# Patient Record
Sex: FEMALE | Race: WHITE | NOT HISPANIC OR LATINO
[De-identification: names, ages, dates, MRNs, and addresses within clinical notes are randomized per-mention and may not be internally consistent; named-entity substitution may affect disease eponyms.]

---

## 2019-11-21 ENCOUNTER — APPOINTMENT (OUTPATIENT)
Dept: OBGYN | Facility: CLINIC | Age: 44
End: 2019-11-21
Payer: COMMERCIAL

## 2019-11-21 VITALS
BODY MASS INDEX: 21.66 KG/M2 | SYSTOLIC BLOOD PRESSURE: 102 MMHG | WEIGHT: 130 LBS | HEIGHT: 65 IN | DIASTOLIC BLOOD PRESSURE: 67 MMHG

## 2019-11-21 DIAGNOSIS — Z12.31 ENCOUNTER FOR SCREENING MAMMOGRAM FOR MALIGNANT NEOPLASM OF BREAST: ICD-10-CM

## 2019-11-21 DIAGNOSIS — N92.6 IRREGULAR MENSTRUATION, UNSPECIFIED: ICD-10-CM

## 2019-11-21 PROBLEM — Z00.00 ENCOUNTER FOR PREVENTIVE HEALTH EXAMINATION: Status: ACTIVE | Noted: 2019-11-21

## 2019-11-21 LAB
HCG UR QL: NEGATIVE
QUALITY CONTROL: YES

## 2019-11-21 PROCEDURE — 99386 PREV VISIT NEW AGE 40-64: CPT

## 2019-11-21 PROCEDURE — 81025 URINE PREGNANCY TEST: CPT

## 2019-11-22 LAB — HPV HIGH+LOW RISK DNA PNL CVX: NOT DETECTED

## 2019-12-01 NOTE — PHYSICAL EXAM
[Awake] : awake [Alert] : alert [Soft] : soft [Oriented x3] : oriented to person, place, and time [Normal] : uterus [No Bleeding] : there was no active vaginal bleeding [Uterine Adnexae] : were not tender and not enlarged [LAD] : no lymphadenopathy [Acute Distress] : no acute distress [Thyroid Nodule] : no thyroid nodule [Goiter] : no goiter [Mass] : no breast mass [Nipple Discharge] : no nipple discharge [Axillary LAD] : no axillary lymphadenopathy [Distended] : not distended [Tender] : non tender [Depressed Mood] : not depressed [H/Smegaly] : no hepatosplenomegaly [Flat Affect] : affect not flat

## 2019-12-02 LAB — CYTOLOGY CVX/VAG DOC THIN PREP: NORMAL

## 2020-06-09 ENCOUNTER — RESULT CHARGE (OUTPATIENT)
Age: 45
End: 2020-06-09

## 2020-06-09 ENCOUNTER — APPOINTMENT (OUTPATIENT)
Dept: OBGYN | Facility: CLINIC | Age: 45
End: 2020-06-09
Payer: COMMERCIAL

## 2020-06-09 VITALS
HEIGHT: 65 IN | WEIGHT: 129 LBS | DIASTOLIC BLOOD PRESSURE: 75 MMHG | SYSTOLIC BLOOD PRESSURE: 108 MMHG | BODY MASS INDEX: 21.49 KG/M2

## 2020-06-09 DIAGNOSIS — Z30.430 ENCOUNTER FOR INSERTION OF INTRAUTERINE CONTRACEPTIVE DEVICE: ICD-10-CM

## 2020-06-09 DIAGNOSIS — Z53.8 PROCEDURE AND TREATMENT NOT CARRIED OUT FOR OTHER REASONS: ICD-10-CM

## 2020-06-09 LAB
HCG UR QL: NEGATIVE
QUALITY CONTROL: YES

## 2020-06-09 PROCEDURE — 58300 INSERT INTRAUTERINE DEVICE: CPT | Mod: 52

## 2020-06-09 PROCEDURE — 81025 URINE PREGNANCY TEST: CPT

## 2020-06-09 NOTE — PROCEDURE
[IUD Placement] : intrauterine device (IUD) placement [Prevention of Pregnancy] : prevention of pregnancy [Risks] : risks [Benefits] : benefits [Patient] : patient [Alternatives] : alternatives [Bleeding] : bleeding [Infection] : infection [Pain] : pain [Failure] : failure [Expulsion] : expulsion [Uterine Perforation] : uterine perforation [Neg Pregnancy Test] : a pregnancy test was negative [Betadine] : Prepped with Betadine [CONSENT OBTAINED] : written consent was obtained prior to the procedure. [Tenaculum] : a single toothed tenaculum [Mirena IUD] : The Mirena IUD was inserted past the internal cervical os. The IUD was then gently inserted upwards toward the fundus.  The IUD strings were cut to an appropriate length. [Tolerated Well] : the patient tolerated the procedure well [Easy Passage] : allowed easy passage of a uterine sound without dilation [Motrin/Ibuprofen] : Motrin/Ibuprofen [Uterus Sounded to ___cm] : sounded to [unfilled]Ucm [de-identified] : mirena came out when removing tube for insertion, small uterus

## 2021-03-25 ENCOUNTER — APPOINTMENT (OUTPATIENT)
Dept: OBGYN | Facility: CLINIC | Age: 46
End: 2021-03-25
Payer: COMMERCIAL

## 2021-03-25 VITALS
HEIGHT: 65 IN | SYSTOLIC BLOOD PRESSURE: 117 MMHG | DIASTOLIC BLOOD PRESSURE: 71 MMHG | WEIGHT: 126 LBS | BODY MASS INDEX: 20.99 KG/M2

## 2021-03-25 DIAGNOSIS — R23.2 FLUSHING: ICD-10-CM

## 2021-03-25 DIAGNOSIS — Z71.1 PERSON WITH FEARED HEALTH COMPLAINT IN WHOM NO DIAGNOSIS IS MADE: ICD-10-CM

## 2021-03-25 DIAGNOSIS — Z11.3 ENCOUNTER FOR SCREENING FOR INFECTIONS WITH A PREDOMINANTLY SEXUAL MODE OF TRANSMISSION: ICD-10-CM

## 2021-03-25 DIAGNOSIS — Z01.419 ENCOUNTER FOR GYNECOLOGICAL EXAMINATION (GENERAL) (ROUTINE) W/OUT ABNORMAL FINDINGS: ICD-10-CM

## 2021-03-25 PROCEDURE — 99214 OFFICE O/P EST MOD 30 MIN: CPT | Mod: 25

## 2021-03-25 PROCEDURE — 99396 PREV VISIT EST AGE 40-64: CPT

## 2021-03-25 PROCEDURE — 99072 ADDL SUPL MATRL&STAF TM PHE: CPT

## 2021-03-25 RX ORDER — MISOPROSTOL 200 UG/1
200 TABLET ORAL DAILY
Qty: 2 | Refills: 0 | Status: COMPLETED | COMMUNITY
Start: 2019-11-21 | End: 2021-03-25

## 2021-03-25 RX ORDER — NORETHINDRONE ACETATE AND ETHINYL ESTRADIOL AND FERROUS FUMARATE 1MG-20(21)
1-20 KIT ORAL DAILY
Qty: 1 | Refills: 0 | Status: DISCONTINUED | COMMUNITY
Start: 2020-06-09 | End: 2021-03-25

## 2021-03-25 RX ORDER — MISOPROSTOL 200 UG/1
200 TABLET ORAL
Qty: 2 | Refills: 0 | Status: COMPLETED | COMMUNITY
Start: 2020-06-08 | End: 2021-03-25

## 2021-03-25 NOTE — HISTORY OF PRESENT ILLNESS
[PGHxTotal] : 4 [PGHxAbortions] : 2 [facility] [PGHxABInduced] : 1 [PGHxABSpont] : 1 [FreeTextEntry1] :

## 2021-03-27 LAB — HPV HIGH+LOW RISK DNA PNL CVX: NOT DETECTED

## 2021-03-30 LAB — CYTOLOGY CVX/VAG DOC THIN PREP: ABNORMAL

## 2021-03-31 DIAGNOSIS — B96.89 ACUTE VAGINITIS: ICD-10-CM

## 2021-03-31 DIAGNOSIS — N76.0 ACUTE VAGINITIS: ICD-10-CM

## 2021-03-31 RX ORDER — ETONOGESTREL AND ETHINYL ESTRADIOL 11.7; 2.7 MG/1; MG/1
0.12-0.015 INSERT, EXTENDED RELEASE VAGINAL
Qty: 1 | Refills: 3 | Status: ACTIVE | COMMUNITY
Start: 2021-03-31 | End: 1900-01-01

## 2021-03-31 RX ORDER — METRONIDAZOLE 7.5 MG/G
0.75 GEL VAGINAL
Qty: 1 | Refills: 1 | Status: ACTIVE | COMMUNITY
Start: 2021-03-31 | End: 1900-01-01

## 2021-04-05 LAB
C TRACH RRNA SPEC QL NAA+PROBE: NOT DETECTED
N GONORRHOEA RRNA SPEC QL NAA+PROBE: NOT DETECTED
SOURCE TP AMPLIFICATION: NORMAL

## 2021-08-09 LAB
BASOPHILS # BLD AUTO: 0.03 K/UL
BASOPHILS NFR BLD AUTO: 0.4 %
EOSINOPHIL # BLD AUTO: 0.07 K/UL
EOSINOPHIL NFR BLD AUTO: 1 %
ESTRADIOL SERPL-MCNC: 25 PG/ML
FSH SERPL-MCNC: 13.9 IU/L
HCT VFR BLD CALC: 38.6 %
HGB BLD-MCNC: 12.9 G/DL
IMM GRANULOCYTES NFR BLD AUTO: 0.1 %
LYMPHOCYTES # BLD AUTO: 2.09 K/UL
LYMPHOCYTES NFR BLD AUTO: 29.9 %
MAN DIFF?: NORMAL
MCHC RBC-ENTMCNC: 32.1 PG
MCHC RBC-ENTMCNC: 33.4 GM/DL
MCV RBC AUTO: 96 FL
MONOCYTES # BLD AUTO: 0.53 K/UL
MONOCYTES NFR BLD AUTO: 7.6 %
NEUTROPHILS # BLD AUTO: 4.27 K/UL
NEUTROPHILS NFR BLD AUTO: 61 %
PLATELET # BLD AUTO: 281 K/UL
RBC # BLD: 4.02 M/UL
RBC # FLD: 12.7 %
WBC # FLD AUTO: 7 K/UL

## 2025-04-17 ENCOUNTER — EMERGENCY (EMERGENCY)
Facility: HOSPITAL | Age: 50
LOS: 0 days | Discharge: HOME | End: 2025-04-17
Attending: EMERGENCY MEDICINE
Payer: COMMERCIAL

## 2025-04-17 VITALS
RESPIRATION RATE: 17 BRPM | TEMPERATURE: 98 F | HEART RATE: 68 BPM | OXYGEN SATURATION: 100 % | DIASTOLIC BLOOD PRESSURE: 63 MMHG | SYSTOLIC BLOOD PRESSURE: 99 MMHG

## 2025-04-17 VITALS
HEART RATE: 71 BPM | TEMPERATURE: 98 F | DIASTOLIC BLOOD PRESSURE: 76 MMHG | RESPIRATION RATE: 18 BRPM | WEIGHT: 145.28 LBS | SYSTOLIC BLOOD PRESSURE: 121 MMHG | OXYGEN SATURATION: 100 %
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
## 2025-04-17 DIAGNOSIS — N93.9 ABNORMAL UTERINE AND VAGINAL BLEEDING, UNSPECIFIED: ICD-10-CM

## 2025-04-17 DIAGNOSIS — S31.41XA LACERATION WITHOUT FOREIGN BODY OF VAGINA AND VULVA, INITIAL ENCOUNTER: ICD-10-CM

## 2025-04-17 DIAGNOSIS — R42 DIZZINESS AND GIDDINESS: ICD-10-CM
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
## 2025-04-17 LAB
APTT BLD: 28.4 SEC — SIGNIFICANT CHANGE UP (ref 24.5–35.6)
BLD GP AB SCN SERPL QL: SIGNIFICANT CHANGE UP
FIBRINOGEN PPP-MCNC: 192 MG/DL — LOW (ref 200–435)
HCT VFR BLD CALC: 38.6 % — SIGNIFICANT CHANGE UP (ref 34.5–45)
HGB BLD-MCNC: 12.6 G/DL — SIGNIFICANT CHANGE UP (ref 11.5–15.5)
INR BLD: 0.97 RATIO — SIGNIFICANT CHANGE UP (ref 0.85–1.16)
MCHC RBC-ENTMCNC: 30.8 PG — SIGNIFICANT CHANGE UP (ref 27–34)
MCHC RBC-ENTMCNC: 32.6 G/DL — SIGNIFICANT CHANGE UP (ref 32–36)
MCV RBC AUTO: 94.4 FL — SIGNIFICANT CHANGE UP (ref 80–100)
NRBC # BLD AUTO: 0 K/UL — SIGNIFICANT CHANGE UP (ref 0–0)
NRBC # FLD: 0 K/UL — SIGNIFICANT CHANGE UP (ref 0–0)
NRBC BLD AUTO-RTO: 0 /100 WBCS — SIGNIFICANT CHANGE UP (ref 0–0)
PLATELET # BLD AUTO: 313 K/UL — SIGNIFICANT CHANGE UP (ref 150–400)
PMV BLD: 9.9 FL — SIGNIFICANT CHANGE UP (ref 7–13)
POCT URINE PREGNANCY TEST: NEGATIVE — SIGNIFICANT CHANGE UP
PROTHROM AB SERPL-ACNC: 11.4 SEC — SIGNIFICANT CHANGE UP (ref 9.9–13.4)
RBC # BLD: 4.09 M/UL — SIGNIFICANT CHANGE UP (ref 3.8–5.2)
RBC # FLD: 12.3 % — SIGNIFICANT CHANGE UP (ref 10.3–14.5)
WBC # BLD: 8.72 K/UL — SIGNIFICANT CHANGE UP (ref 3.8–10.5)
WBC # FLD AUTO: 8.72 K/UL — SIGNIFICANT CHANGE UP (ref 3.8–10.5)

## 2025-04-17 PROCEDURE — 99284 EMERGENCY DEPT VISIT MOD MDM: CPT | Mod: 25

## 2025-04-17 PROCEDURE — 36415 COLL VENOUS BLD VENIPUNCTURE: CPT

## 2025-04-17 PROCEDURE — 85610 PROTHROMBIN TIME: CPT

## 2025-04-17 PROCEDURE — 85027 COMPLETE CBC AUTOMATED: CPT

## 2025-04-17 PROCEDURE — 12011 RPR F/E/E/N/L/M 2.5 CM/<: CPT

## 2025-04-17 PROCEDURE — 96374 THER/PROPH/DIAG INJ IV PUSH: CPT | Mod: XU

## 2025-04-17 PROCEDURE — 12001 RPR S/N/AX/GEN/TRNK 2.5CM/<: CPT

## 2025-04-17 PROCEDURE — 85730 THROMBOPLASTIN TIME PARTIAL: CPT

## 2025-04-17 PROCEDURE — 85384 FIBRINOGEN ACTIVITY: CPT

## 2025-04-17 PROCEDURE — 96375 TX/PRO/DX INJ NEW DRUG ADDON: CPT | Mod: XU

## 2025-04-17 PROCEDURE — 81025 URINE PREGNANCY TEST: CPT

## 2025-04-17 PROCEDURE — 99285 EMERGENCY DEPT VISIT HI MDM: CPT

## 2025-04-17 PROCEDURE — 86850 RBC ANTIBODY SCREEN: CPT

## 2025-04-17 PROCEDURE — 86900 BLOOD TYPING SEROLOGIC ABO: CPT

## 2025-04-17 PROCEDURE — 86901 BLOOD TYPING SEROLOGIC RH(D): CPT

## 2025-04-17 RX ORDER — KETOROLAC TROMETHAMINE 30 MG/ML
30 INJECTION, SOLUTION INTRAMUSCULAR; INTRAVENOUS ONCE
Refills: 0 | Status: DISCONTINUED | OUTPATIENT
Start: 2025-04-17 | End: 2025-04-17

## 2025-04-17 RX ORDER — ACETAMINOPHEN 500 MG/5ML
1000 LIQUID (ML) ORAL ONCE
Refills: 0 | Status: COMPLETED | OUTPATIENT
Start: 2025-04-17 | End: 2025-04-17

## 2025-04-17 RX ORDER — CEPHALEXIN 250 MG/1
500 CAPSULE ORAL ONCE
Refills: 0 | Status: COMPLETED | OUTPATIENT
Start: 2025-04-17 | End: 2025-04-17

## 2025-04-17 RX ORDER — CEPHALEXIN 250 MG/1
1 CAPSULE ORAL
Qty: 28 | Refills: 0
Start: 2025-04-17 | End: 2025-04-23

## 2025-04-17 RX ADMIN — KETOROLAC TROMETHAMINE 30 MILLIGRAM(S): 30 INJECTION, SOLUTION INTRAMUSCULAR; INTRAVENOUS at 16:42

## 2025-04-17 RX ADMIN — Medication 400 MILLIGRAM(S): at 17:06

## 2025-04-17 RX ADMIN — Medication 1000 MILLILITER(S): at 16:42

## 2025-04-17 RX ADMIN — CEPHALEXIN 500 MILLIGRAM(S): 250 CAPSULE ORAL at 16:42

## 2025-04-17 RX ADMIN — Medication 3 MILLILITER(S): at 17:05

## 2025-04-17 NOTE — ED STATDOCS - CLINICAL SUMMARY MEDICAL DECISION MAKING FREE TEXT BOX
Pt with bleeding vulvar biopsy site that was performed at 8:00am this morning. Will move to room to perform pelvic examination and evaluate wound. Will also have GYN evaluate pt.

## 2025-04-17 NOTE — PROCEDURE NOTE - ADDITIONAL PROCEDURE DETAILS
Patient placed in lithotomy position   Active bleeding noted from the R upper vulva 0.5cm laceration in location of biopsy performed this morning at 0800 in the GYN office   Attempted to cauterize with silver nitrate without success  Area cleaned with Betadine solution  1% lidocaine administered   Laceration repaired with 3 interrupted 4-0 Vicryl sutures  Hemostasis achieved  Patient tolerated procedure well

## 2025-04-17 NOTE — ED STATDOCS - CARE PLAN
Principal Discharge DX:	Encounter for medical screening examination   1 Principal Discharge DX:	Vaginal laceration

## 2025-04-17 NOTE — ED ADULT TRIAGE NOTE - CHIEF COMPLAINT QUOTE
Pt ambulatory to ED with c/o vaginal bleeding s/o biopsy at GYN office this morning. Pt reports changing pad every 30 mins since procedure. Pt denies pain or sob. - AC

## 2025-04-17 NOTE — ED STATDOCS - OBJECTIVE STATEMENT
49 year old female with no pertinent PMHx presenting to the ED c/o vaginal bleeding. Pt is s/p vulvar biopsy at [facility] at 8:00am this morning. Pt began to notice bleeding from biopsy site and was advised to come to the ED for further evaluation. Pt is not on blood thinners, and has no associated symptoms.

## 2025-04-17 NOTE — ED STATDOCS - PHYSICAL EXAMINATION
Physical Exam:  Gen: NAD, non-toxic appearing, able to ambulate without assistance  Head: NCAT  HEENT: EOMI, PEERLA, normal conjunctiva, tongue midline, oral mucosa moist  Lung: CTAB, no respiratory distress, no wheezes/rhonchi/rales B/L, speaking in full sentences  CV: RRR, no murmurs, rubs or gallops, distal pulses 2+ b/l  Abd: soft, nontender, no distention, no guarding, no rigidity, no rebound tenderness  MSK: no visible deformities, ROM normal in UE/LE  Skin: Warm, well perfused, no rash  Psych: normal affect, calm   Pelvic exam deferred at this time, will pt to room.

## 2025-04-17 NOTE — ED STATDOCS - PROGRESS NOTE DETAILS
Pt. presenting with chronic pain secondary to splenomegaly and hepatomegaly.  Pt. saw hematology this morning and had labs drawn.  Labs in ED unremarkable.  Pt. requesting pain management.  She does have a GI to follow with as well.  Belle Peña PA-C 49 year old female with no pertinent PMHx presenting to the ED c/o vaginal bleeding. Pt is s/p vulvar biopsy at [facility] at 8:00am this morning. Pt began to notice bleeding from biopsy site and was advised to come to the ED for further evaluation. Pt is not on blood thinners, and has no associated symptoms.  Belle Peña PA-C external vaginal exam with small arterial bleed media right labia.  GYN in room to evaluate.  Chaperone used for my exam.  Belle Peña PA-C GYN team placed sutures with hemostasis.  Follow with GYN in the morning.  Pt. deferred narcotic pain meds.  Return for any concerns.  Labs discussed with GYN team and she may be DC.  Belle Peña PA-C

## 2025-04-17 NOTE — H&P ADULT - HISTORY OF PRESENT ILLNESS
KAMILAH BHARDWAJ  49y  Female    Patient is a 49y old  Female who presents with a chief complaint of vulvar bleeding s/p biopsy    HPI: 48 y/o female , no pmhx, presents to [place] ED with vulvar bleeding s/p biopsy this morning with GYN. Pt had biopsy at 8am for intermittent itchy vulvar rash x 1 year. Pt states she went home after procedure and noticed constant bleeding, 1 pad per hour since procedure and intermittent pain. Pt is menopausal and notes irregular periods. Has had pap smear within last 3 years, reported normal.  Patient notes lightheadedness in ED. Denies headache, fever, nausea, vomiting, blurry vision hx of bleeding disorders, easy bruising/bleeding, hx of blood transfusions.       REVIEW OF SYSTEMS:  CONSTITUTIONAL: No weakness, fevers or chills  RESPIRATORY: No cough, wheezing, hemoptysis; No shortness of breath  CARDIOVASCULAR: No chest pain or palpitations  GENITOURINARY: No dysuria, frequency, hematuria, vaginal bleeding  SKIN: rash in vulvar region denies lesions   All other review of systems is negative unless indicated above    MEDICATIONS  (STANDING):  cephalexin 500 milliGRAM(s) Oral once  ketorolac   Injectable 30 milliGRAM(s) IV Push Once  sodium chloride 0.9% Bolus 1000 milliLiter(s) IV Bolus once  sodium chloride 0.9% lock flush 3 milliLiter(s) IV Push every 8 hours    MEDICATIONS  (PRN):      Allergies    No Known Allergies    Intolerances        PAST MEDICAL & SURGICAL HISTORY: denies past medical/surgical history      OB/GYN HISTORY:  Pt is beatris-menopausal, notes irregular periods                                         G 1                                          Last Pap smear:  within last 3 years, reported normal                                                SOCIAL HISTORY: denies smoking, alcohol use, illicit drug us    Vital Signs Last 24 Hrs  T(C): 36.7 (2025 13:08), Max: 36.7 (2025 13:08)  T(F): 98.1 (2025 13:08), Max: 98.1 (2025 13:08)  HR: 71 (2025 13:08) (71 - 71)  BP: 121/76 (2025 13:08) (121/76 - 121/76)  BP(mean): 86 (2025 13:08) (86 - 86)  RR: 18 (2025 13:08) (18 - 18)  SpO2: 100% (2025 13:08) (100% - 100%)    Parameters below as of 2025 13:08  Patient On (Oxygen Delivery Method): room air        Last Menstrual Period      PHYSICAL EXAM:  Constitutional: NAD, awake and alert, well-developed  Genitourinary: biopsy site  right upper vulva bleeding, erythema, No vaginal bleeding.   Extremities: No peripheral edema  Neurological: A/O x 3, no focal deficits  Musculoskeletal: 5/5 strength b/l upper and lower extremities  Skin: vulvar rash, no lesions,

## 2025-04-17 NOTE — CONSULT NOTE ADULT - NS ATTEND AMEND GEN_ALL_CORE FT
Pt seen and examined.  Agree with note above.  Pt with good response after sutures placed.  Pt to follow up with her primary gyn tomorrow.  Cleansing and care disc with the patient.      PLEE

## 2025-04-17 NOTE — CONSULT NOTE ADULT - ASSESSMENT
48 y/o female no pmhx, presents to [place] ED with vulvar bleeding s/p biopsy this morning with GYN. Pt had biopsy at 8am for intermittent itchy vulvar rash x 1 year. Pt states she went home after procedure and noticed constant bleeding, 1 pad per hour since procedure and intermittent pain. Patient notes lightheadedness in ED. Physical exam shows continuous bleeding at the site of biopsy in right upper vulva. Bleeding uncontrolled with pressure dressing. Vitals within normal limits.     Plan  - trial of cautery of site with silver nitrate  - repair of origin site of bleeding   - order CBC and coags   -repeat vitals   -cephalexin 500mg PO x7 days to reduce infection risk

## 2025-04-17 NOTE — ED STATDOCS - NSFOLLOWUPINSTRUCTIONS_ED_ALL_ED_FT
Chronic Abdominal Pain    WHAT YOU NEED TO KNOW:    Chronic abdominal pain lasts longer than 3 months.  Abdominal Organs    DISCHARGE INSTRUCTIONS:    Seek care immediately if:    Your abdominal pain gets worse, and spreads to your back.    You vomit blood or what looks like coffee grounds.    You have blood or mucus in your bowel movement.    You cannot stop vomiting.    You have diarrhea for more than 1 week.    You feel weak, dizzy, or faint.    Your abdomen is larger than usual, more painful, and hard.  Call your doctor if:    You have a fever or chills.    You have new symptoms.    You lose weight without trying.    Your pain prevents you from doing your daily activities.    You have questions or concerns about your condition or care.  Medicines: You may need any of the following:    NSAIDs, such as ibuprofen, help decrease swelling, pain, and fever. NSAIDs can cause stomach bleeding or kidney problems in certain people. If you take blood thinner medicine, always ask your healthcare provider if NSAIDs are safe for you. Always read the medicine label and follow directions.    Acetaminophen decreases pain and fever. It is available without a doctor's order. Ask how much to take and how often to take it. Follow directions. Read the labels of all other medicines you are using to see if they also contain acetaminophen, or ask your doctor or pharmacist. Acetaminophen can cause liver damage if not taken correctly.    Prescription pain medicine may be given. Ask your healthcare provider how to take this medicine safely. Some prescription pain medicines contain acetaminophen. Do not take other medicines that contain acetaminophen without talking to your healthcare provider. Too much acetaminophen may cause liver damage. Prescription pain medicine may cause constipation. Ask your healthcare provider how to prevent or treat constipation.    Medicines may be given to manage symptoms such as vomiting or constipation. Medicines may also be used to help manage a cause of your pain, such as anxiety.    Take your medicine as directed. Contact your healthcare provider if you think your medicine is not helping or if you have side effects. Tell your provider if you are allergic to any medicine. Keep a list of the medicines, vitamins, and herbs you take. Include the amounts, and when and why you take them. Bring the list or the pill bottles to follow-up visits. Carry your medicine list with you in case of an emergency.  Manage chronic abdominal pain:    Apply heat on your abdomen for 20 to 30 minutes every 2 hours for as many days as directed. Heat helps decrease pain and muscle spasms.    Make changes to the foods you eat, if needed. Do not eat foods that cause abdominal pain or other symptoms. Eat small meals more often. The following changes may also help:  Eat more high-fiber foods if you are constipated. High-fiber foods include fruits, vegetables, whole-grain foods, and legumes such as delatorre beans.        Do not eat foods that cause gas if you have bloating. Examples include broccoli, cabbage, beans, and carbonated drinks.    Do not eat foods or drinks that contain sorbitol or fructose if you have diarrhea and bloating. Some examples are fruit juices, candy, jelly, and sugar-free gum.    Do not eat high-fat foods. Examples include fried foods, cheeseburgers, hot dogs, and desserts.    Make changes to the liquids you drink, if needed. Do not drink liquids that cause pain or make it worse, such as orange juice. Drink liquids throughout the day to stay hydrated. The following changes may also help:  Drink more liquids to prevent dehydration from diarrhea or vomiting. Ask your healthcare provider how much liquid to drink each day and which liquids are best for you.    Limit or do not have caffeine. Caffeine may make symptoms such as heartburn or nausea worse.    Limit or do not drink alcohol. Alcohol can make your abdominal pain worse. Ask your healthcare provider if it is okay for you to drink alcohol. Also ask how much is okay for you to drink. A drink of alcohol is 12 ounces of beer, 1½ ounce of liquor, or 5 ounces of wine.    Keep track of your abdominal pain. This may help your healthcare provider learn what is causing your pain. Include when the pain happens, how long it lasts, and what the pain feels like. Include any other symptoms you have. Also track what you eat, and any symptoms you have after you eat.    Manage stress. Stress may cause abdominal pain. Your healthcare provider may recommend relaxation techniques and deep breathing exercises to help decrease your stress. Your healthcare provider may recommend you talk to someone about your stress or anxiety, such as a counselor or a friend. Get plenty of sleep. Exercise regularly.   FAMILY WALKING FOR EXERCISE      Do not smoke. Nicotine and other chemicals in cigarettes can damage your esophagus and stomach. Ask your healthcare provider for information if you currently smoke and need help to quit. E-cigarettes or smokeless tobacco still contain nicotine. Talk to your healthcare provider before you use these products.  Follow up with your doctor as directed: You may need more tests. Write down your questions so you remember to ask them during your visits. Vaginal Laceration  Close-up of the female genitals showing the vagina and perineum.  A vaginal laceration is a cut or tear of the opening of the vagina or the inside of the vaginal canal.    What are the causes?  This condition may be caused by:  Childbirth. It may also be caused by tools that are used to help deliver a baby, such as forceps.  Sex.  An injury from sports, bike riding, or other activities.  Thinning, dryness, or irritation of the vagina due to low estrogen levels (vulvovaginal atrophy).  What increases the risk?  You are more likely to develop this condition if you:  Give birth vaginally.  Are sexually active.  Have gone through menopause.  Have low estrogen levels due to certain medicines, breast cancer treatments, or breastfeeding.  What are the signs or symptoms?  Symptoms of this condition include:  Slight to heavy vaginal bleeding.  Vaginal swelling.  Mild to severe pain.  Painful urination.  Pain or discomfort during sex.  How is this diagnosed?  If the tear happened during childbirth, your health care provider can diagnose the tear at that time. Other tears or lacerations can be diagnosed with your medical history and a physical exam. You may have other tests, including:  Blood tests to check your hormone levels and blood loss.  Imaging tests, such as an ultrasonogram or CT scan, to rule out other health issues, such as enlarged lymph nodes or tumors.  How is this treated?  Treatment depends on the severity of the tear or laceration. Minor injuries may heal on their own. If needed, this condition may be treated with:  Stitches (sutures).  Medicines, such as:  Creams to reduce pain.  Vaginal lubricants to treat vaginal dryness.  Topical or oral hormonal therapy.  Antibiotics. These may be taken orally or given as ointments to prevent or treat infection.  Surgery may be needed if the tear is severe.    Follow these instructions at home:  Medicines    Take or apply over-the-counter and prescription medicines only as told by your health care provider.  If you were prescribed an antibiotic medicine, take it as told by your health care provider. Do not stop using the antibiotic even if you start to feel better.  Ask your health care provider if the medicine prescribed to you:  Requires you to avoid driving or using machinery.  Can cause constipation. You may need to take these actions to prevent or treat constipation:  Drink enough fluid to keep your urine pale yellow.  Take over-the-counter or prescription medicines.  Eat foods that are high in fiber, such as beans, whole grains, and fresh fruits and vegetables.  Limit foods that are high in fat and processed sugars, such as fried or sweet foods.  Wound care    Check every day for signs of infection. Check for:  Swelling or pain.  Fluid or blood.  Warmth.  Pus or a bad smell.  Managing pain and swelling    A bathtub partially filled with water.  Bag of ice on a towel on the skin.  Take a sitz bath 2 to 3 times a day or as told by your health care provider. A sitz bath is a shallow, warm water bath that is taken while you are sitting down. The water should only come up to your hips and should cover your buttocks.  If directed, put ice on the injured area. To do this:  Put ice in a plastic bag.  Place a towel between your skin and the bag.  Leave the ice on for 20 minutes, 2–3 times a day.  Remove the ice if your skin turns bright red. This is very important. If you cannot feel pain, heat, or cold, you have a greater risk of damage to the area.  General instructions    Avoid sitting or standing for long periods of time.  Lie on your side while sleeping or resting.  Avoid straining during bowel movements. Ask your health care provider if a stool softener is needed.  Do not douche, use a tampon, or have sex until your health care provider says this is okay.  Keep all follow-up visits. This is important.  Contact a health care provider if:  You have increased swelling or pain in the vaginal area.  You have more fluid or blood coming from your vaginal tear.  You have pus or a bad smell coming from your vaginal area.  You have a fever.  You have a tear that breaks open after it healed or was repaired.  You have a burning pain when you urinate.  You continue to have pain during sex after the tear heals.  Get help right away if:  You have severe pain around your vagina or in your pelvis or lower abdomen.  You have heavy vaginal bleeding, or you are soaking more than one menstrual pad an hour.  These symptoms may represent a serious problem that is an emergency. Do not wait to see if the symptoms will go away. Get medical help right away. Call your local emergency services (911 in the [place] Do not drive yourself to the hospital.    Summary  A vaginal laceration is a cut or tear of the opening of the vagina or the inside of the vaginal canal.  It is caused by childbirth, sex, injury, or thinning, dryness, or irritation of the vagina due to low estrogen levels.  Treatment depends on the severity of the tear or laceration. Minor injuries may heal on their own. If needed, this condition may be treated with sutures, medicines, or surgery if the tear is severe.  This information is not intended to replace advice given to you by your health care provider. Make sure you discuss any questions you have with your health care provider.

## 2025-04-17 NOTE — ED STATDOCS - PATIENT PORTAL LINK FT
You can access the FollowMyHealth Patient Portal offered by [facility] by registering at the following website: [email or web address]. By joining [employer]’s FollowMyHealth portal, you will also be able to view your health information using other applications (apps) compatible with our system. You can access the FollowMyHealth Patient Portal offered by [facility] by registering at the following website: [email or web address]. By joining [employer]’s FollowMyHealth portal, you will also be able to view your health information using other applications (apps) compatible with our system.

## 2025-04-17 NOTE — ED STATDOCS - ATTENDING APP SHARED VISIT CONTRIBUTION OF CARE
I,Yehuda Crump MD,  performed the initial face to face bedside interview with this patient regarding history of present illness, review of symptoms and relevant past medical, social and family history.  I completed an independent physical examination.  I was the initial provider who evaluated this patient. I have signed out the follow up of any pending tests (i.e. labs, radiological studies) to the ACP.  I have communicated the patient’s plan of care and disposition with the ACP.  The history, relevant review of systems, past medical and surgical history, medical decision making, and physical examination was documented by the scribe in my presence and I attest to the accuracy of the documentation.

## 2025-04-17 NOTE — H&P ADULT - ASSESSMENT
Assessment:  48 y/o female no pmhx, presents to [place] ED with vulvar bleeding s/p biopsy this morning with GYN. Pt had biopsy at 8am for intermittent itchy vulvar rash x 1 year. Pt states she went home after procedure and noticed constant bleeding, 1 pad per hour since procedure and intermittent pain. Patient notes lightheadedness in ED. Physical exam shows continuous bleeding at the site of biopsy in right upper vulva. Bleeding uncontrolled with pressure dressing.    Plan  - cautery of site with silver nitrate  - if unsuccessful suture origin site of bleeding   - order CBC and coags   -cephalexin 500mg PO x7 days for infection prophylaxis

## 2025-04-17 NOTE — PROCEDURE NOTE - NSPOSTCAREGUIDE_GEN_A_CORE
f/u GYN tomorrow in office/Verbal/written post procedure instructions were given to patient/caregiver/Instructed patient/caregiver regarding signs and symptoms of infection

## 2025-04-17 NOTE — CONSULT NOTE ADULT - SUBJECTIVE AND OBJECTIVE BOX
History of Present Illness:   KAMILAH BHARDWAJ  49y  Female    Patient is a 49y old  Female who presents with a chief complaint of vulvar bleeding s/p biopsy    HPI: 48 y/o female , no pmhx, presents to [facility] ED with vulvar bleeding s/p biopsy this morning with GYN. Pt had biopsy at 8am for intermittent itchy vulvar rash x 1 year. Pt states she went home after procedure and noticed constant bleeding, 1 pad per hour since procedure and intermittent pain. Pt is menopausal and notes irregular periods. Has had pap smear within last 3 years, reported normal.  Patient notes lightheadedness in ED. Denies headache, fever, nausea, vomiting, blurry vision hx of bleeding disorders, easy bruising/bleeding, hx of blood transfusions.       REVIEW OF SYSTEMS:  CONSTITUTIONAL: No weakness, fevers or chills  RESPIRATORY: No cough, wheezing, hemoptysis; No shortness of breath  CARDIOVASCULAR: No chest pain or palpitations  GENITOURINARY: No dysuria, frequency, hematuria, vaginal bleeding  SKIN: rash in vulvar region denies lesions   All other review of systems is negative unless indicated above    MEDICATIONS  (STANDING):  cephalexin 500 milliGRAM(s) Oral once  ketorolac   Injectable 30 milliGRAM(s) IV Push Once  sodium chloride 0.9% Bolus 1000 milliLiter(s) IV Bolus once  sodium chloride 0.9% lock flush 3 milliLiter(s) IV Push every 8 hours    MEDICATIONS  (PRN):      Allergies    No Known Allergies    Intolerances        PAST MEDICAL & SURGICAL HISTORY: denies past medical/surgical history      OB/GYN HISTORY:  Pt is beatris-menopausal, notes irregular periods         G 1                                          Last Pap smear:  within last 3 years, reported normal                                                SOCIAL HISTORY: denies smoking, alcohol use, illicit drug us    Vital Signs Last 24 Hrs  T(C): 36.7 (2025 13:08), Max: 36.7 (2025 13:08)  T(F): 98.1 (2025 13:08), Max: 98.1 (2025 13:08)  HR: 71 (2025 13:08) (71 - 71)  BP: 121/76 (2025 13:08) (121/76 - 121/76)  BP(mean): 86 (2025 13:08) (86 - 86)  RR: 18 (2025 13:08) (18 - 18)  SpO2: 100% (2025 13:08) (100% - 100%)    Parameters below as of 2025 13:08  Patient On (Oxygen Delivery Method): room air        Last Menstrual Period      PHYSICAL EXAM:  Constitutional: NAD, awake and alert, well-developed  Genitourinary: biopsy site  right upper vulva bleeding, erythema, No vaginal bleeding.   Extremities: No peripheral edema  Neurological: A/O x 3, no focal deficits  Musculoskeletal: 5/5 strength b/l upper and lower extremities  Skin: vulvar rash, no lesions,    History of Present Illness:   KAMILAH BHARDWAJ  49y  Female    Patient is a 49y old  Female who presents with a chief complaint of vulvar bleeding s/p biopsy    HPI: 48 y/o female , no pmhx, presents to [facility] ED with vulvar bleeding s/p biopsy this morning with GYN. Pt had biopsy at 8am for intermittent itchy vulvar rash x 1 year. Pt states she went home after procedure and noticed constant bleeding, 1 pad per hour since procedure and intermittent pain. Pt is beatris- menopausal and notes irregular periods. Has had pap smear within last 3 years, reported normal.  Patient notes lightheadedness in ED. Denies headache, fever, nausea, vomiting, blurry vision hx of bleeding disorders, easy bruising/bleeding, hx of blood transfusions.       REVIEW OF SYSTEMS:  CONSTITUTIONAL: No weakness, fevers or chills  RESPIRATORY: No cough, wheezing, hemoptysis; No shortness of breath  CARDIOVASCULAR: No chest pain or palpitations  GENITOURINARY: No dysuria, frequency, hematuria, vaginal bleeding  SKIN: rash in vulvar region denies lesions   All other review of systems is negative unless indicated above    MEDICATIONS  (STANDING):  cephalexin 500 milliGRAM(s) Oral once  ketorolac   Injectable 30 milliGRAM(s) IV Push Once  sodium chloride 0.9% Bolus 1000 milliLiter(s) IV Bolus once  sodium chloride 0.9% lock flush 3 milliLiter(s) IV Push every 8 hours    MEDICATIONS  (PRN):      Allergies    No Known Allergies    Intolerances        PAST MEDICAL & SURGICAL HISTORY: denies past medical/surgical history      OB/GYN HISTORY:  Pt is beatris-menopausal, notes irregular periods         G 1                                          Last Pap smear:  within last 3 years, reported normal                                                SOCIAL HISTORY: denies smoking, alcohol use, illicit drug us    Vital Signs Last 24 Hrs  T(C): 36.7 (2025 13:08), Max: 36.7 (2025 13:08)  T(F): 98.1 (2025 13:08), Max: 98.1 (2025 13:08)  HR: 71 (2025 13:08) (71 - 71)  BP: 121/76 (2025 13:08) (121/76 - 121/76)  BP(mean): 86 (2025 13:08) (86 - 86)  RR: 18 (2025 13:08) (18 - 18)  SpO2: 100% (2025 13:08) (100% - 100%)    Parameters below as of 2025 13:08  Patient On (Oxygen Delivery Method): room air        Last Menstrual Period      PHYSICAL EXAM:  Constitutional: NAD, awake and alert, well-developed  Genitourinary: biopsy site  right upper vulva bleeding, erythema, No vaginal bleeding.   Extremities: No peripheral edema  Neurological: A/O x 3, no focal deficits  Musculoskeletal: 5/5 strength b/l upper and lower extremities  Skin: vulvar rash, no lesions,

## 2025-04-17 NOTE — ED ADULT NURSE NOTE - OBJECTIVE STATEMENT
49y female presented to the ED with complaints of vaginal bleeding. Pt had biopsy this morning and has been bleeding since.

## 2025-05-07 ENCOUNTER — APPOINTMENT (OUTPATIENT)
Dept: GYNECOLOGIC ONCOLOGY | Facility: CLINIC | Age: 50
End: 2025-05-07

## 2025-05-07 ENCOUNTER — NON-APPOINTMENT (OUTPATIENT)
Age: 50
End: 2025-05-07

## 2025-05-07 VITALS
OXYGEN SATURATION: 100 % | BODY MASS INDEX: 22.33 KG/M2 | HEART RATE: 62 BPM | HEIGHT: 65 IN | WEIGHT: 134 LBS | DIASTOLIC BLOOD PRESSURE: 69 MMHG | SYSTOLIC BLOOD PRESSURE: 107 MMHG

## 2025-05-07 DIAGNOSIS — Z78.9 OTHER SPECIFIED HEALTH STATUS: ICD-10-CM

## 2025-05-07 DIAGNOSIS — Z80.51 FAMILY HISTORY OF MALIGNANT NEOPLASM OF KIDNEY: ICD-10-CM

## 2025-05-07 DIAGNOSIS — C51.9 MALIGNANT NEOPLASM OF VULVA, UNSPECIFIED: ICD-10-CM

## 2025-05-07 DIAGNOSIS — Z85.820 PERSONAL HISTORY OF MALIGNANT MELANOMA OF SKIN: ICD-10-CM

## 2025-05-07 DIAGNOSIS — Z80.42 FAMILY HISTORY OF MALIGNANT NEOPLASM OF PROSTATE: ICD-10-CM

## 2025-05-07 PROCEDURE — 99204 OFFICE O/P NEW MOD 45 MIN: CPT

## 2025-05-07 PROCEDURE — 99459 PELVIC EXAMINATION: CPT

## 2025-05-07 PROCEDURE — G2211 COMPLEX E/M VISIT ADD ON: CPT | Mod: NC

## 2025-05-08 PROBLEM — C51.9: Status: ACTIVE | Noted: 2025-05-08

## 2025-05-14 ENCOUNTER — RESULT REVIEW (OUTPATIENT)
Age: 50
End: 2025-05-14

## 2025-05-14 ENCOUNTER — TRANSCRIPTION ENCOUNTER (OUTPATIENT)
Age: 50
End: 2025-05-14

## 2025-05-14 ENCOUNTER — OUTPATIENT (OUTPATIENT)
Dept: OUTPATIENT SERVICES | Facility: HOSPITAL | Age: 50
LOS: 1 days | End: 2025-05-14
Payer: COMMERCIAL

## 2025-05-14 DIAGNOSIS — N93.9 ABNORMAL UTERINE AND VAGINAL BLEEDING, UNSPECIFIED: ICD-10-CM

## 2025-05-14 DIAGNOSIS — C51.9 MALIGNANT NEOPLASM OF VULVA, UNSPECIFIED: ICD-10-CM

## 2025-05-14 PROCEDURE — 88321 CONSLTJ&REPRT SLD PREP ELSWR: CPT

## 2025-05-15 ENCOUNTER — OUTPATIENT (OUTPATIENT)
Dept: OUTPATIENT SERVICES | Facility: HOSPITAL | Age: 50
LOS: 1 days | End: 2025-05-15
Payer: COMMERCIAL

## 2025-05-15 VITALS
TEMPERATURE: 99 F | WEIGHT: 137.79 LBS | HEIGHT: 66 IN | RESPIRATION RATE: 16 BRPM | DIASTOLIC BLOOD PRESSURE: 68 MMHG | HEART RATE: 77 BPM | OXYGEN SATURATION: 100 % | SYSTOLIC BLOOD PRESSURE: 101 MMHG

## 2025-05-15 DIAGNOSIS — N93.9 ABNORMAL UTERINE AND VAGINAL BLEEDING, UNSPECIFIED: ICD-10-CM

## 2025-05-15 DIAGNOSIS — K08.409 PARTIAL LOSS OF TEETH, UNSPECIFIED CAUSE, UNSPECIFIED CLASS: Chronic | ICD-10-CM

## 2025-05-15 DIAGNOSIS — Z01.818 ENCOUNTER FOR OTHER PREPROCEDURAL EXAMINATION: ICD-10-CM

## 2025-05-15 DIAGNOSIS — C51.9 MALIGNANT NEOPLASM OF VULVA, UNSPECIFIED: ICD-10-CM

## 2025-05-15 PROBLEM — Z80.51 FAMILY HISTORY OF MALIGNANT NEOPLASM OF KIDNEY: Status: ACTIVE | Noted: 2025-05-15

## 2025-05-15 PROBLEM — Z85.820 HISTORY OF MALIGNANT MELANOMA: Status: RESOLVED | Noted: 2025-05-15 | Resolved: 2025-05-15

## 2025-05-15 PROBLEM — Z80.42 FAMILY HISTORY OF MALIGNANT NEOPLASM OF PROSTATE: Status: ACTIVE | Noted: 2025-05-15

## 2025-05-15 PROBLEM — Z78.9 NON-SMOKER: Status: ACTIVE | Noted: 2025-05-15

## 2025-05-15 LAB
ANION GAP SERPL CALC-SCNC: 6 MMOL/L — SIGNIFICANT CHANGE UP (ref 5–17)
BASOPHILS # BLD AUTO: 0.02 K/UL — SIGNIFICANT CHANGE UP (ref 0–0.2)
BASOPHILS NFR BLD AUTO: 0.2 % — SIGNIFICANT CHANGE UP (ref 0–2)
BLD GP AB SCN SERPL QL: SIGNIFICANT CHANGE UP
BUN SERPL-MCNC: 23 MG/DL — SIGNIFICANT CHANGE UP (ref 7–23)
CALCIUM SERPL-MCNC: 9.7 MG/DL — SIGNIFICANT CHANGE UP (ref 8.5–10.1)
CHLORIDE SERPL-SCNC: 106 MMOL/L — SIGNIFICANT CHANGE UP (ref 96–108)
CO2 SERPL-SCNC: 25 MMOL/L — SIGNIFICANT CHANGE UP (ref 22–31)
CREAT SERPL-MCNC: 0.88 MG/DL — SIGNIFICANT CHANGE UP (ref 0.5–1.3)
EGFR: 82 ML/MIN/1.73M2 — SIGNIFICANT CHANGE UP
EGFR: 82 ML/MIN/1.73M2 — SIGNIFICANT CHANGE UP
EOSINOPHIL # BLD AUTO: 0 K/UL — SIGNIFICANT CHANGE UP (ref 0–0.5)
EOSINOPHIL NFR BLD AUTO: 0 % — SIGNIFICANT CHANGE UP (ref 0–6)
GLUCOSE SERPL-MCNC: 117 MG/DL — HIGH (ref 70–99)
HCG SERPL-ACNC: <1 MIU/ML — SIGNIFICANT CHANGE UP
HCT VFR BLD CALC: 38.2 % — SIGNIFICANT CHANGE UP (ref 34.5–45)
HGB BLD-MCNC: 12.8 G/DL — SIGNIFICANT CHANGE UP (ref 11.5–15.5)
IMM GRANULOCYTES # BLD AUTO: 0.02 K/UL — SIGNIFICANT CHANGE UP (ref 0–0.07)
IMM GRANULOCYTES NFR BLD AUTO: 0.2 % — SIGNIFICANT CHANGE UP (ref 0–0.9)
LYMPHOCYTES # BLD AUTO: 1.17 K/UL — SIGNIFICANT CHANGE UP (ref 1–3.3)
LYMPHOCYTES NFR BLD AUTO: 10.9 % — LOW (ref 13–44)
MCHC RBC-ENTMCNC: 31.4 PG — SIGNIFICANT CHANGE UP (ref 27–34)
MCHC RBC-ENTMCNC: 33.5 G/DL — SIGNIFICANT CHANGE UP (ref 32–36)
MCV RBC AUTO: 93.6 FL — SIGNIFICANT CHANGE UP (ref 80–100)
MONOCYTES # BLD AUTO: 0.22 K/UL — SIGNIFICANT CHANGE UP (ref 0–0.9)
MONOCYTES NFR BLD AUTO: 2 % — SIGNIFICANT CHANGE UP (ref 2–14)
NEUTROPHILS # BLD AUTO: 9.35 K/UL — HIGH (ref 1.8–7.4)
NEUTROPHILS NFR BLD AUTO: 86.7 % — HIGH (ref 43–77)
NRBC # BLD AUTO: 0 K/UL — SIGNIFICANT CHANGE UP (ref 0–0)
NRBC # FLD: 0 K/UL — SIGNIFICANT CHANGE UP (ref 0–0)
NRBC BLD AUTO-RTO: 0 /100 WBCS — SIGNIFICANT CHANGE UP (ref 0–0)
PLATELET # BLD AUTO: 287 K/UL — SIGNIFICANT CHANGE UP (ref 150–400)
PMV BLD: 10.2 FL — SIGNIFICANT CHANGE UP (ref 7–13)
POTASSIUM SERPL-MCNC: 4.2 MMOL/L — SIGNIFICANT CHANGE UP (ref 3.5–5.3)
POTASSIUM SERPL-SCNC: 4.2 MMOL/L — SIGNIFICANT CHANGE UP (ref 3.5–5.3)
RBC # BLD: 4.08 M/UL — SIGNIFICANT CHANGE UP (ref 3.8–5.2)
RBC # FLD: 12.5 % — SIGNIFICANT CHANGE UP (ref 10.3–14.5)
SODIUM SERPL-SCNC: 137 MMOL/L — SIGNIFICANT CHANGE UP (ref 135–145)
WBC # BLD: 10.78 K/UL — HIGH (ref 3.8–10.5)
WBC # FLD AUTO: 10.78 K/UL — HIGH (ref 3.8–10.5)

## 2025-05-15 PROCEDURE — 86803 HEPATITIS C AB TEST: CPT

## 2025-05-15 PROCEDURE — 86850 RBC ANTIBODY SCREEN: CPT

## 2025-05-15 PROCEDURE — 85025 COMPLETE CBC W/AUTO DIFF WBC: CPT

## 2025-05-15 PROCEDURE — 80048 BASIC METABOLIC PNL TOTAL CA: CPT

## 2025-05-15 PROCEDURE — 86900 BLOOD TYPING SEROLOGIC ABO: CPT

## 2025-05-15 PROCEDURE — 86901 BLOOD TYPING SEROLOGIC RH(D): CPT

## 2025-05-15 PROCEDURE — 99214 OFFICE O/P EST MOD 30 MIN: CPT | Mod: 25

## 2025-05-15 PROCEDURE — 84702 CHORIONIC GONADOTROPIN TEST: CPT

## 2025-05-15 PROCEDURE — 36415 COLL VENOUS BLD VENIPUNCTURE: CPT

## 2025-05-15 RX ORDER — FLUTICASONE FUROATE 27.5 UG/1
SPRAY, METERED NASAL
Refills: 0 | Status: ACTIVE | COMMUNITY

## 2025-05-15 NOTE — H&P PST ADULT - ASSESSMENT
Plan:  1. PST instructions given ; NPO status/  instructions to be given by ASU   2. Pt instructed to take following meds on day of surgery:   3. Pt instructed to take routine evening medications unless indicated   4. Stop NSAIDS ( Aspirin Alev Motrin Mobic Diclofenac), herbal supplements , MVI , Vitamin fish oil 7 days prior to surgery  unless   directed by surgeon or cardiologist;   5. Medical Optimization  with    6. EZ wash instructions given & mupirocin instructions given  7. CBC BMP PTT, PT/INR T&S EKG  done        49 year old female diagnosed with vulvar cancer; Patient has had chronic redness and excoriation in the vulvar area which comes and goes; She asked dermatologist for a biopsy because condition has not improved; After biopsy done in April 2025, area of biopsy did not stop bleeding after cautery; she had to get stitches done in vulvar area to stop  bleeding @ ; she presents to PST for planned D&C hysteroscopy vulva wide local excision          Plan:  1. PST instructions given ; NPO status/  instructions to be given by ASU   2. Pt instructed to take following meds on day of surgery: none  3. Pt instructed to take routine evening medications unless indicated   4. Stop NSAIDS ( Aspirin Alev Motrin Mobic Diclofenac), herbal supplements , MVI , Vitamin fish oil 7 days prior to surgery  unless   directed by surgeon or cardiologist;   5. Medical Optimization not indicated   6. Urine for pregnancy on day of surgery   7. CBC BMP T&S HcG quantitative  done

## 2025-05-15 NOTE — H&P PST ADULT - HISTORY OF PRESENT ILLNESS
49 year old female diagnosed with vulvar cancer; Patient has had chronic redness and excoriation in the vulvar area which comes and goes; She asked dermatologist for a biopsy because condition has not improved; After biopsy done in April 2025, area of biopsy did not stop bleeding after cautery; she had to get stitches done in vulvar area to stop  bleeding; she presents to [facility] for planned D&C hysteroscopy vulva wide local excision   49 year old female diagnosed with vulvar cancer; Patient has had chronic redness and excoriation in the vulvar area which comes and goes; She asked dermatologist for a biopsy because condition has not improved; After biopsy done in April 2025, area of biopsy did not stop bleeding after cautery; she had to get stitches done in vulvar area to stop  bleeding @ ; she presents to [facility] for planned D&C hysteroscopy vulva wide local excision

## 2025-05-15 NOTE — H&P PST ADULT - NSICDXFAMILYHX_GEN_ALL_CORE_FT
FAMILY HISTORY:  Father  Still living? Unknown  FH: pancreatic cancer, Age at diagnosis: Age Unknown  FH: prostate cancer, Age at diagnosis: Age Unknown    Mother  Still living? Unknown  FH: kidney cancer, Age at diagnosis: Age Unknown

## 2025-05-16 DIAGNOSIS — Z01.818 ENCOUNTER FOR OTHER PREPROCEDURAL EXAMINATION: ICD-10-CM

## 2025-05-16 LAB
HCV AB S/CO SERPL IA: 0.33 S/CO — SIGNIFICANT CHANGE UP (ref 0–0.79)
HCV AB SERPL-IMP: SIGNIFICANT CHANGE UP

## 2025-05-19 LAB — SURGICAL PATHOLOGY STUDY: SIGNIFICANT CHANGE UP

## 2025-05-21 ENCOUNTER — TRANSCRIPTION ENCOUNTER (OUTPATIENT)
Age: 50
End: 2025-05-21

## 2025-05-21 ENCOUNTER — RESULT REVIEW (OUTPATIENT)
Age: 50
End: 2025-05-21

## 2025-05-21 ENCOUNTER — OUTPATIENT (OUTPATIENT)
Dept: INPATIENT UNIT | Facility: HOSPITAL | Age: 50
LOS: 1 days | Discharge: HOME | End: 2025-05-21
Payer: COMMERCIAL

## 2025-05-21 VITALS
DIASTOLIC BLOOD PRESSURE: 71 MMHG | TEMPERATURE: 98 F | SYSTOLIC BLOOD PRESSURE: 109 MMHG | OXYGEN SATURATION: 100 % | HEART RATE: 60 BPM | RESPIRATION RATE: 18 BRPM

## 2025-05-21 VITALS
WEIGHT: 137.79 LBS | HEART RATE: 73 BPM | OXYGEN SATURATION: 100 % | RESPIRATION RATE: 16 BRPM | HEIGHT: 66 IN | DIASTOLIC BLOOD PRESSURE: 75 MMHG | TEMPERATURE: 98 F | SYSTOLIC BLOOD PRESSURE: 111 MMHG

## 2025-05-21 DIAGNOSIS — N93.9 ABNORMAL UTERINE AND VAGINAL BLEEDING, UNSPECIFIED: ICD-10-CM

## 2025-05-21 DIAGNOSIS — N84.0 POLYP OF CORPUS UTERI: ICD-10-CM

## 2025-05-21 DIAGNOSIS — Z87.891 PERSONAL HISTORY OF NICOTINE DEPENDENCE: ICD-10-CM

## 2025-05-21 DIAGNOSIS — N84.1 POLYP OF CERVIX UTERI: ICD-10-CM

## 2025-05-21 DIAGNOSIS — K08.409 PARTIAL LOSS OF TEETH, UNSPECIFIED CAUSE, UNSPECIFIED CLASS: Chronic | ICD-10-CM

## 2025-05-21 DIAGNOSIS — C51.9 MALIGNANT NEOPLASM OF VULVA, UNSPECIFIED: ICD-10-CM

## 2025-05-21 DIAGNOSIS — N87.9 DYSPLASIA OF CERVIX UTERI, UNSPECIFIED: ICD-10-CM

## 2025-05-21 LAB — HCG UR QL: NEGATIVE — SIGNIFICANT CHANGE UP

## 2025-05-21 PROCEDURE — 81025 URINE PREGNANCY TEST: CPT

## 2025-05-21 PROCEDURE — 58558 HYSTEROSCOPY BIOPSY: CPT

## 2025-05-21 PROCEDURE — 56620 VULVECTOMY SIMPLE PARTIAL: CPT | Mod: 80

## 2025-05-21 PROCEDURE — 88342 IMHCHEM/IMCYTCHM 1ST ANTB: CPT | Mod: 26

## 2025-05-21 PROCEDURE — 88341 IMHCHEM/IMCYTCHM EA ADD ANTB: CPT | Mod: 26

## 2025-05-21 PROCEDURE — 88305 TISSUE EXAM BY PATHOLOGIST: CPT

## 2025-05-21 PROCEDURE — 88342 IMHCHEM/IMCYTCHM 1ST ANTB: CPT

## 2025-05-21 PROCEDURE — 56620 VULVECTOMY SIMPLE PARTIAL: CPT

## 2025-05-21 PROCEDURE — 88341 IMHCHEM/IMCYTCHM EA ADD ANTB: CPT

## 2025-05-21 PROCEDURE — 88305 TISSUE EXAM BY PATHOLOGIST: CPT | Mod: 26

## 2025-05-21 RX ORDER — CYST/ALA/Q10/PHOS.SER/DHA/BROC 100-20-50
POWDER (GRAM) ORAL
Refills: 0 | DISCHARGE

## 2025-05-21 RX ORDER — SODIUM CHLORIDE 9 G/1000ML
1000 INJECTION, SOLUTION INTRAVENOUS
Refills: 0 | Status: DISCONTINUED | OUTPATIENT
Start: 2025-05-21 | End: 2025-05-21

## 2025-05-21 RX ORDER — OXYCODONE HYDROCHLORIDE 30 MG/1
5 TABLET ORAL ONCE
Refills: 0 | Status: DISCONTINUED | OUTPATIENT
Start: 2025-05-21 | End: 2025-05-21

## 2025-05-21 RX ORDER — ONDANSETRON HCL/PF 4 MG/2 ML
4 VIAL (ML) INJECTION ONCE
Refills: 0 | Status: DISCONTINUED | OUTPATIENT
Start: 2025-05-21 | End: 2025-05-21

## 2025-05-21 RX ORDER — OXYCODONE HYDROCHLORIDE 30 MG/1
10 TABLET ORAL ONCE
Refills: 0 | Status: DISCONTINUED | OUTPATIENT
Start: 2025-05-21 | End: 2025-05-21

## 2025-05-21 RX ORDER — FENTANYL CITRATE-0.9 % NACL/PF 100MCG/2ML
25 SYRINGE (ML) INTRAVENOUS
Refills: 0 | Status: DISCONTINUED | OUTPATIENT
Start: 2025-05-21 | End: 2025-05-21

## 2025-05-21 RX ADMIN — Medication 25 MICROGRAM(S): at 14:32

## 2025-05-21 RX ADMIN — Medication 25 MICROGRAM(S): at 13:48

## 2025-05-21 RX ADMIN — Medication 25 MICROGRAM(S): at 14:02

## 2025-05-21 RX ADMIN — Medication 25 MICROGRAM(S): at 14:17

## 2025-05-21 RX ADMIN — OXYCODONE HYDROCHLORIDE 5 MILLIGRAM(S): 30 TABLET ORAL at 13:52

## 2025-05-21 NOTE — ASU PREOP CHECKLIST - NS PREOP CHK CHLOROHEX WASH
Spoke with [employer], they state patient needs a note of medical necessity/demographics/notes/order. They'll send over a form that instructs office what needs to be submitted for walker per Medicare guidelines. Mrs. Diaz aware. N/A

## 2025-05-21 NOTE — ASU DISCHARGE PLAN (ADULT/PEDIATRIC) - FINANCIAL ASSISTANCE
[facility] provides services at a reduced cost to those who are determined to be eligible through [facility]’s financial assistance program. Information regarding [facility]’s financial assistance program can be found by going to [email or web address] or by calling [phone].

## 2025-05-21 NOTE — ASU DISCHARGE PLAN (ADULT/PEDIATRIC) - NS MD DC FALL RISK RISK
For information on Fall & Injury Prevention, visit: [email or web address] OR  [email or web address] OR  https://www.cdc.gov/steadi/patient.html

## 2025-05-21 NOTE — ASU PATIENT PROFILE, ADULT - FALL HARM RISK - UNIVERSAL INTERVENTIONS
Bed in lowest position, wheels locked, appropriate side rails in place/Call bell, personal items and telephone in reach/Instruct patient to call for assistance before getting out of bed or chair/Non-slip footwear when patient is out of bed/[place] to call system/Physically safe environment - no spills, clutter or unnecessary equipment/Purposeful Proactive Rounding/Room/bathroom lighting operational, light cord in reach

## 2025-05-30 LAB — SURGICAL PATHOLOGY STUDY: SIGNIFICANT CHANGE UP

## 2025-05-31 ENCOUNTER — APPOINTMENT (OUTPATIENT)
Dept: MRI IMAGING | Facility: CLINIC | Age: 50
End: 2025-05-31

## 2025-05-31 PROBLEM — C51.9 MALIGNANT NEOPLASM OF VULVA, UNSPECIFIED: Chronic | Status: ACTIVE | Noted: 2025-05-15

## 2025-06-08 ENCOUNTER — OUTPATIENT (OUTPATIENT)
Dept: OUTPATIENT SERVICES | Facility: HOSPITAL | Age: 50
LOS: 1 days | End: 2025-06-08
Payer: COMMERCIAL

## 2025-06-08 DIAGNOSIS — C51.9 MALIGNANT NEOPLASM OF VULVA, UNSPECIFIED: ICD-10-CM

## 2025-06-08 DIAGNOSIS — K08.409 PARTIAL LOSS OF TEETH, UNSPECIFIED CAUSE, UNSPECIFIED CLASS: Chronic | ICD-10-CM

## 2025-06-08 PROCEDURE — 74183 MRI ABD W/O CNTR FLWD CNTR: CPT

## 2025-06-08 PROCEDURE — A9585: CPT

## 2025-06-11 ENCOUNTER — APPOINTMENT (OUTPATIENT)
Dept: GYNECOLOGIC ONCOLOGY | Facility: CLINIC | Age: 50
End: 2025-06-11
Payer: COMMERCIAL

## 2025-06-11 VITALS — HEIGHT: 65 IN | BODY MASS INDEX: 22.33 KG/M2 | WEIGHT: 134 LBS

## 2025-06-11 PROCEDURE — 99024 POSTOP FOLLOW-UP VISIT: CPT

## 2025-06-18 ENCOUNTER — NON-APPOINTMENT (OUTPATIENT)
Age: 50
End: 2025-06-18

## 2025-08-28 ENCOUNTER — APPOINTMENT (OUTPATIENT)
Dept: OBGYN | Facility: CLINIC | Age: 50
End: 2025-08-28
Payer: COMMERCIAL

## 2025-08-28 VITALS
BODY MASS INDEX: 24.07 KG/M2 | HEIGHT: 65 IN | WEIGHT: 144.5 LBS | SYSTOLIC BLOOD PRESSURE: 112 MMHG | DIASTOLIC BLOOD PRESSURE: 64 MMHG

## 2025-08-28 PROCEDURE — 99396 PREV VISIT EST AGE 40-64: CPT

## 2025-08-28 RX ORDER — ESTRADIOL 0.1 MG/G
0.1 CREAM VAGINAL
Qty: 1 | Refills: 6 | Status: ACTIVE | COMMUNITY
Start: 2025-08-28 | End: 1900-01-01

## 2025-09-01 LAB
C TRACH RRNA SPEC QL NAA+PROBE: NOT DETECTED
HPV HIGH+LOW RISK DNA PNL CVX: NOT DETECTED
N GONORRHOEA RRNA SPEC QL NAA+PROBE: NOT DETECTED
SOURCE TP AMPLIFICATION: NORMAL
T VAGINALIS RRNA SPEC QL NAA+PROBE: NOT DETECTED

## 2025-09-03 LAB — CYTOLOGY CVX/VAG DOC THIN PREP: ABNORMAL
